# Patient Record
Sex: FEMALE | Race: WHITE | NOT HISPANIC OR LATINO | URBAN - METROPOLITAN AREA
[De-identification: names, ages, dates, MRNs, and addresses within clinical notes are randomized per-mention and may not be internally consistent; named-entity substitution may affect disease eponyms.]

---

## 2019-05-13 ENCOUNTER — TELEPHONE (OUTPATIENT)
Dept: GENETICS | Facility: HOSPITAL | Age: 54
End: 2019-05-13

## 2019-05-13 NOTE — TELEPHONE ENCOUNTER
5/13/19- I called Ms. Deirdre Doe to remind her of her cancer genetic counseling session set for 11 am at [facility] on 5/15/19. I guided her to the [address], [address] to register and asked she arrive 15 minutes early. I requested she call back to review family history and address any questions/ concerns she may have.     5/15/19- I called Ms Deirdre Doe in response to her Vm returning my call. I VM. I told her I was returning her call and was hoping to catch her before the appointment, I said we will just expect to see her at 11 am at [facility] and the family history will be reviewed during the appointment.

## 2019-05-15 ENCOUNTER — CLINICAL SUPPORT (OUTPATIENT)
Dept: GENETICS | Age: 54
End: 2019-05-15
Attending: INTERNAL MEDICINE
Payer: COMMERCIAL

## 2019-05-15 VITALS — HEIGHT: 64 IN | WEIGHT: 210 LBS | BODY MASS INDEX: 35.85 KG/M2

## 2019-05-15 DIAGNOSIS — Z84.81 FAMILY HISTORY OF GENE MUTATION: ICD-10-CM

## 2019-05-15 DIAGNOSIS — Z71.83 ENCOUNTER FOR NONPROCREATIVE GENETIC COUNSELING: Primary | ICD-10-CM

## 2019-05-15 DIAGNOSIS — Z86.0100 PERSONAL HISTORY OF COLONIC POLYPS: ICD-10-CM

## 2019-05-15 DIAGNOSIS — Z80.0 FHX: CANCER OF GI TRACT: ICD-10-CM

## 2019-05-15 DIAGNOSIS — Z83.719 FAMILY HISTORY OF COLONIC POLYPS: ICD-10-CM

## 2019-05-15 PROCEDURE — 96040 HC GENETICS COUNSELING SESSIONS: CPT | Performed by: GENETIC COUNSELOR, MS

## 2019-05-15 NOTE — PROGRESS NOTES
"Patient Name:  Deirdre Doe  : 1965       Indication for Appointment:  Deirdre Doe presented for genetic counseling and cancer risk assessment due to a family history of uterine aned colorectal cancer and polyps Deirdre Doe was self-referred and came to the session alone.    Personal History:   Deirdre Doe is 53 y.o. female of Armenian/Cuban descent with primary visit diagnosis of Encounter for nonprocreative genetic counseling [Z71.83].      Past Medical History:   Diagnosis Date   • Colon polyp     3 - type unknown; ~age 47   • Fibroid    • Lipid disorder     controlled with medication   • Ovarian cyst       Past Medical History Pertinent Negatives:   Denies History Of: Date Noted   • Disease of thyroid gland 05/15/2019     Past Surgical History:   Procedure Laterality Date   • Carpal tunnel release     •  section     • Cholecystectomy     • Colonoscopy      Q 5 years since age 40   • Ovarian cyst removal       No past surgical history pertinent negatives on file.     Height/Weight: verbally reported  Height: 1.626 m (5' 4\")  Weight: 95.3 kg (210 lb)    Gynecologic History:  Menarche age: 12 years  Age at first live birth: 37  Menopause Status: [place]-Menopause  Use of hormonal contraceptives: Yes (>5 yrs cumulative use)  Use of fertility medications: No  Use of hormone replacement therapy: No  Use of Tamoxifen/Evista: No     Social History   Substance Use Topics   • Smoking status: Passive Smoke Exposure - Never Smoker   • Smokeless tobacco: Never Used   • Alcohol use Yes      Comment: rarely       Family History:  See completed family history in pedigree below.    Genetic Education/Risk Assessment/Counseling:  Information was provided about the relationship between genes and cancer.  The concept of hereditary cancer was defined.  Natural history, risks and inheritance patterns of  cancer-associated genes were reviewed, as related to Deirdre Doe’s personal and/or family history.  Related " psychosocial aspects were discussed.    Discussion of Genetic Testing:  The pros, cons, and limitations of testing for genetic susceptibility were discussed, including but not limited to test options, possible results, potential impact on management, and psychosocial aspects.  There may be limited data on the degree of cancer risk and/or no defined management guidelines associated with some genes.  If applicable, risk assessment models and/or published tables were used to provide a mutation probability estimate. Limitations of assessment were reviewed.    Given the reported personal and/or family history, genetic testing genetic testing was offered and accepted. The following testing was ordered:    [email or web address]  [facility] Hereditary Cancer Panel      Plan:  Deirdre Doe was confirmed to have understood the aforementioned information and was assisted with decision making as needed.  Informational and supportive resources were provided. Consent was obtained to share chart note(s) with physicians. Deirdre Doe is encouraged to contact the program with personal/family history updates. Deirdre Doe will be contacted via telephone when genetic test results are available.     Additionally, pathology from Deirdre Doe's colorectal polyps will be requested.    A total of 90 minutes was spent providing genetic counseling to Deirdre Doe.

## 2019-05-15 NOTE — Clinical Note
05/15/19    Cayla Chávez MD  [address]  [address] [facility], [address] [place], [place] [id number]      Dear Dr. Chávez,    Thank you for referring your patient, Deirdre Doe, to receive care in my office. I have enclosed a summary of the care provided to Deirdre on 05/15/19.    Please contact me with any questions you may have regarding the visit.    Sincerely,             Lourdes Gutierrez, [facility]    [address] [facility] [address] [phone]    CC: Julio César Johnson MD

## 2019-05-15 NOTE — LETTER
05/15/19    David Walker, DO  [address] [place] [id number]      Dear Dr. Walker,    I am writing to confirm that your patient, Deirdre Doe, received care in my office on 05/15/19. I have enclosed a summary of the care provided to Deirdre for your reference.    Please contact me with any questions you may have regarding the visit.    Sincerely,           Lourdes Gutierrez [facility]      CC: No Recipients

## 2019-05-15 NOTE — LETTER
05/15/19    Deirdre Doe  [address] [place] [address]    Dear Ms. Doe,    Thank you for participating in the [facility].  It was a pleasure working with you. Attached is documentation from our discussion(s). It will also be sent to any physicians you indicated.      You may also choose to share this documentation with your family members. Because cancer risk is based on both personal and both maternal and paternal family history factors, as well as mutation status, your relatives are recommended to review their risks and genetic testing options (if applicable) with their own healthcare providers to derive a risk-appropriate, individualized plan.      If you have any questions, concerns, or updates to your personal/family history, please contact the [facility] at [phone] to further review your case.    Please see below for your encounter report.    Sincerely,         Lourdes Gutierrez, [facility]                                              Encounter Note       Indication for Appointment:  Deirdre Doe presented for genetic counseling and cancer risk assessment due to a family history of uterine aned colorectal cancer and polyps Deirdre Doe was self-referred and came to the session alone.    Personal History:   Deirdre Doe is 53 y.o. female of Malian/Colombian descent with primary visit diagnosis of Encounter for nonprocreative genetic counseling [Z71.83].      Past Medical History:   Diagnosis Date   • Colon polyp     3 - type unknown; ~age 47   • Fibroid    • Lipid disorder     controlled with medication   • Ovarian cyst       Past Medical History Pertinent Negatives:   Denies History Of: Date Noted   • Disease of thyroid gland 05/15/2019     Past Surgical History:   Procedure Laterality Date   • Carpal tunnel release     •  section     • Cholecystectomy     • Colonoscopy      Q 5 years since age 40   •  "Ovarian cyst removal  1995     No past surgical history pertinent negatives on file.     Height/Weight: verbally reported  Height: 1.626 m (5' 4\")  Weight: 95.3 kg (210 lb)    Gynecologic History:  Menarche age: 12 years  Age at first live birth: 37  Menopause Status: [place]-Menopause  Use of hormonal contraceptives: Yes (>5 yrs cumulative use)  Use of fertility medications: No  Use of hormone replacement therapy: No  Use of Tamoxifen/Evista: No     Social History   Substance Use Topics   • Smoking status: Passive Smoke Exposure - Never Smoker   • Smokeless tobacco: Never Used   • Alcohol use Yes      Comment: rarely       Family History:  See completed family history in pedigree below.    Genetic Education/Risk Assessment/Counseling:  Information was provided about the relationship between genes and cancer.  The concept of hereditary cancer was defined.  Natural history, risks and inheritance patterns of  cancer-associated genes were reviewed, as related to Deirdre Doe’s personal and/or family history.  Related psychosocial aspects were discussed.    Discussion of Genetic Testing:  The pros, cons, and limitations of testing for genetic susceptibility were discussed, including but not limited to test options, possible results, potential impact on management, and psychosocial aspects.  There may be limited data on the degree of cancer risk and/or no defined management guidelines associated with some genes.  If applicable, risk assessment models and/or published tables were used to provide a mutation probability estimate. Limitations of assessment were reviewed.    Given the reported personal and/or family history, genetic testing genetic testing was offered and accepted. The following testing was ordered:    [facility]  [facility] Hereditary Cancer Panel      Plan:  Deirdre Doe was confirmed to have understood the aforementioned information and was assisted with decision making as needed.  Informational and " supportive resources were provided. Consent was obtained to share chart note(s) with physicians. Deirdre Doe is encouraged to contact the program with personal/family history updates. Deirdre Doe will be contacted via telephone when genetic test results are available.     Additionally, pathology from Deirdre Doe's colorectal polyps will be requested.    A total of 90 minutes was spent providing genetic counseling to Deirdre Doe.

## 2019-05-15 NOTE — LETTER
05/15/19    Cayla Chávez MD  [address]  [address] [facility], [address]  [place] [id number]      Dear Dr. Chávez,    I am writing to confirm that your patient, Deirdre Doe, received care in my office on 05/15/19. I have enclosed a summary of the care provided to Deirdre for your reference.    Please contact me with any questions you may have regarding the visit.    Sincerely,           Lourdes Gutierrez [facility]      CC: Julio César Johnson MD

## 2019-05-15 NOTE — LETTER
05/15/19    Deirdre Doe  [address] [place] [address]    Dear Ms. Doe,    Thank you for participating in the [facility].  It was a pleasure working with you. Attached is documentation from our discussion(s). It will also be sent to any physicians you indicated.      You may also choose to share this documentation with your family members. Because cancer risk is based on both personal and both maternal and paternal family history factors, as well as mutation status, your relatives are recommended to review their risks and genetic testing options (if applicable) with their own healthcare providers to derive a risk-appropriate, individualized plan.      If you have any questions, concerns, or updates to your personal/family history, please contact the [facility] at [phone] to further review your case.    Please see below for your encounter report.    Sincerely,         Lourdes Gutierrez, [facility]                                              Encounter Note       Indication for Appointment:  Deirdre Doe presented for genetic counseling and cancer risk assessment due to a family history of uterine aned colorectal cancer and polyps Deirdre Doe was self-referred and came to the session alone.    Personal History:   Deirdre Doe is 53 y.o. female of Ukrainian/Belgian descent with primary visit diagnosis of Encounter for nonprocreative genetic counseling [Z71.83].      Past Medical History:   Diagnosis Date   • Colon polyp     3 - type unknown; ~age 47   • Fibroid    • Lipid disorder     controlled with medication   • Ovarian cyst       Past Medical History Pertinent Negatives:   Denies History Of: Date Noted   • Disease of thyroid gland 05/15/2019     Past Surgical History:   Procedure Laterality Date   • Carpal tunnel release     •  section     • Cholecystectomy     • Colonoscopy      Q 5 years since age 40   •  "Ovarian cyst removal  1995     No past surgical history pertinent negatives on file.     Height/Weight: verbally reported  Height: 1.626 m (5' 4\")  Weight: 95.3 kg (210 lb)    Gynecologic History:  Menarche age: 12 years  Age at first live birth: 37  Menopause Status: [place]-Menopause  Use of hormonal contraceptives: Yes (>5 yrs cumulative use)  Use of fertility medications: No  Use of hormone replacement therapy: No  Use of Tamoxifen/Evista: No     Social History   Substance Use Topics   • Smoking status: Passive Smoke Exposure - Never Smoker   • Smokeless tobacco: Never Used   • Alcohol use Yes      Comment: rarely       Family History:  See completed family history in pedigree below.    Genetic Education/Risk Assessment/Counseling:  Information was provided about the relationship between genes and cancer.  The concept of hereditary cancer was defined.  Natural history, risks and inheritance patterns of  cancer-associated genes were reviewed, as related to Deirdre Doe’s personal and/or family history.  Related psychosocial aspects were discussed.    Discussion of Genetic Testing:  The pros, cons, and limitations of testing for genetic susceptibility were discussed, including but not limited to test options, possible results, potential impact on management, and psychosocial aspects.  There may be limited data on the degree of cancer risk and/or no defined management guidelines associated with some genes.  If applicable, risk assessment models and/or published tables were used to provide a mutation probability estimate. Limitations of assessment were reviewed.    Given the reported personal and/or family history, genetic testing genetic testing was offered and accepted. The following testing was ordered:    [facility]  [facility] Hereditary Cancer Panel      Plan:  Deirdre Doe was confirmed to have understood the aforementioned information and was assisted with decision making as needed.  Informational and " supportive resources were provided. Consent was obtained to share chart note(s) with physicians. Deirdre Doe is encouraged to contact the program with personal/family history updates. Deirdre Doe will be contacted via telephone when genetic test results are available.     Additionally, pathology from Deirdre Doe's colorectal polyps will be requested.    A total of 90 minutes was spent providing genetic counseling to Deirdre Doe.

## 2019-06-03 ENCOUNTER — TELEPHONE (OUTPATIENT)
Dept: GENETICS | Age: 54
End: 2019-06-03

## 2019-06-03 NOTE — LETTER
08/11/19    Cayla Chávez MD  [address]  [address] [facility], [address]  [facility] [address]      Dear Dr. Chávez,    Thank you for referring your patient, Deirdre Doe, to receive care in my office. I have enclosed a summary of the care provided to Deirdre on 08/11/19.    Please contact me with any questions you may have regarding the visit.    Sincerely,             Lourdes Gutierrez, [facility]    [address] [facility] [place] [facility]  [place] [address]    CC: Julio César Johnson MD

## 2019-06-03 NOTE — LETTER
08/11/19    David Walker, DO  [address] [place] [address]      Dear Dr. Walker,    I am writing to confirm that your patient, Deirdre Doe, received care in my office on 08/11/19. I have enclosed a summary of the care provided to Deirdre for your reference.    Please contact me with any questions you may have regarding the visit.    Sincerely,           Lourdes Gutierrez [facility]      CC: No Recipients

## 2019-06-03 NOTE — LETTER
08/11/19    Deirdre Doe  [address]  [place] [address]    Dear Ms. Doe,    Thank you for participating in the [facility].  It was a pleasure working with you. Attached is documentation from our discussion(s). It will also be sent to any physicians you indicated.      You may also choose to share this documentation with your family members. Because cancer risk is based on both personal and both maternal and paternal family history factors, as well as mutation status, your relatives are recommended to review their risks and genetic testing options (if applicable) with their own healthcare providers to derive a risk-appropriate, individualized plan.      If you have any questions, concerns, or updates to your personal/family history, please contact the [facility] at [phone] to further review your case.    Please see below for your encounter report.    Sincerely,         Lourdes Gutierrez, [facility]                                              Encounter Note          Indication for Appointment:  Deirdre Doe was referred to the Cancer Risk Assessment and Genetics Program due to family history of uterine aned colorectal cancer and polyps and a familial mutation in the PMS2 gene. Genetic testing was performed.    Deirdre Doe was contacted by telephone today to discuss genetic test results, risk-based management guidelines and any potential additional test options. Follow up appointments to discuss the results in more detail with our medical director may be scheduled by contacting the Cancer Risk Assessment and Genetics Program.      Genetic Test Results:    RESULT:    Negative - No Clinically Significant Mutation Identified.  LAB/TEST:  [facility]  [facility] Hereditary Cancer Panel      Genes Analyzed: Unless otherwise noted sequencing and large rearrangement analyses were performed on the following genes:  APC,  BIANCA, AXIN2, BARD1, BMPR1A, BRCA1, BRCA2, BRIP1, CDH1, CDK4, CDKN2A, CHEK2, EPCAM (large rearrangement only), HOXB13 (sequencing only), GALNT12, MLH1, MSH2, MSH3 (excluding repetitive portions of exon 1), MSH6, MUTYH, NBN, NTHL1, PALB2, PMS2, PTEN, RAD51C, RAD51D, RNF43, RPS20, SMAD4, STK11, TP53. Sequencing was performed for select regions of POLE and POLD1, and large rearrangement analysis was performed for select regions of GREM1 (see technical specifications).        After receiving consent, the following result(s) were disclosed to Deirdre Doe:   - No reportable alterations were identified by sequencing and/or deletion/duplication analysis as interpreted by this laboratory.  This is referred to as an indeterminate negative result.  A mutation could be present that cannot be detected by the current tests performed or in a gene not tested. Additionally, biological family members may have a mutation in any of the genes tested Deirdre Doe does not given the negative result.    - Deirdre Doe was informed that the PMS2 mutation in the family was not identified. With respect to this mutation, this is referred to as a true negative result.      Personal History:   Deirdre Doe is 53 y.o. female of [place] descent.    Past Medical History:   Diagnosis Date   • Colon polyps (2 total)      - 1 hyperplastic polyp and 1 tubular adenoma (cecum) (GI Pathology Accession #[id number]); colonoscopy in 2017 was clear per Office staff.   • Fibroid    • Lipid disorder     controlled with medication   • Ovarian cyst      Past Medical History Pertinent Negatives:   Denies History Of: Date Noted   • Disease of thyroid gland 05/15/2019     Past Surgical History:   Procedure Laterality Date   • Carpal tunnel release     •  section     • Cholecystectomy     • Colonoscopy      Q 5 years since age 40   • Ovarian cyst removal       No past surgical history pertinent negatives on file.     Gynecologic  History:  Menarche age: 12 years  Age at first live birth: 37  Menopause Status: [place]-Menopause  Use of hormonal contraceptives: Yes (>5 yrs cumulative use)  Use of fertility medications: No  Use of hormone replacement therapy: No  Use of Tamoxifen/Evista: No    Social History   Substance Use Topics   • Smoking status: Passive Smoke Exposure - Never Smoker   • Smokeless tobacco: Never Used   • Alcohol use Yes      Comment: rarely       Family History:  See completed family history in pedigree below.  Of note, results of Deirdre Doe's paternal aunt were received after the initial appointment. The PMS2 mutation in Deirdre Doe's paternal aunt is called c.2113G>A ([id number]) ([facility] (Accession# [id number]).         Risk Assessment and Management:  While closer to the general population, cancer risks among those who test negative for a confirmed mutation in the family may be increased due to personal and familial risk factors.  Of note, not all affected relatives in Deirdre Doe's family have undergone testing to confirm whether the mutation identified in her paternal aunt is tracking with the other Finn-syndrome related cancers in the family.  Available screening guidelines were reviewed. The efficacy of screening for many cancers remains unclear or under investigation; as guidelines continually change, Deirdre Doe is encouraged to review the history with managing physician regularly.       Breast Management:  Deirdre Doe’s lifetime risk of developing breast cancer was calculated using the Hina model and is estimated to be that of the general population with a 5-year risk of 1.6%.  Her breast cancer risk using the Tyrer-Cuzick v7 model is approximately 14%.   Of note, this risks may be an underestimation as all breast cancer cases in the family cannot be considered in this analysis; factors such as breast density can also impact risk.  Breast cancer risk is dynamic and can  increase with age and other factors. A high lifetime risk for developing breast cancer is typically 20-25% or higher.    - National Comprehensive Cancer Network (NCCN) guidelines for breast cancer screening include: monthly self breast examinations, clinical breast examination performed by a physician every 12 months and annual mammogram beginning by age 40.    - [facility] provides information on remaining lifetime breast cancer risk using their Breast Cancer riskScore™, which is based on an analysis of 86 genetic markers combined with specific personal and family history information. Of note, the riskScore™ result has currently only been validated in patients of  and Ashkenazi Druze ancestry. Additionally, accuracy of the personal and family history information provided for this analysis can impact the riskScore™ estimate. The [facility] Breast Cancer riskScore™ model has estimated the patient’s remaining lifetime risk score to be 11%.  Of note, current NCCN management guidelines do not list the riskScore™ estimate as a validated method for assessing breast cancer risk at this time.     Gynecologic Management:  Continue gynecologic exam annually or as directed by physician.    Gastrointestinal Management:  Deirdre Doe is advised to continue with colorectal cancer screening as directed by physician, or sooner if symptoms or changes in history warrant sooner evaluation. Based on the reported family history of colorectal cancer not known to be attributable to the confirmed PMS2 mutation in the family, Deirdre Doe’s lifetime risk of developing colorectal cancer is increased 2-3 fold over that of the general population.  General population risk is approximately 5-6%; thus, Deirdre Doe’s risk is 10-18%. This may be an underestimation of risk as it does not take into account Deirdre Doe's personal history or family history of colorectal polyps. The patient was encouraged to review her  personal and family history and risk with managing gastroenterologist to determine frequency of colonoscopy.    Dermatologic Management:  Deirdre Doe is encouraged to practice skin protective behaviors, such as limiting direct sun exposure, using sunscreen, and pursuing annual skin screening by a physician is recommended.    General Management:  - Annual physical examination is encouraged.  - Adherence to a healthy lifestyle, including body mass index (BMI) <25 obtained through balanced diet and exercise, limiting intake of alcoholic beverages to less than 1 drink per day (serving equals 1 ounce of liquor, 6 ounces of wine or 8 ounces of beer) and not smoking.    Plan:  Cancer risks are based on personal history, as well as on maternal and paternal family histories, mutation status, and other factors.  Relatives are encouraged to consider risk assessment and/or genetic evaluation to derive a risk-appropriate, individualized plan.  Should family member(s) be interested in genetic evaluation, the Cancer Risk Assessment and Genetics Program can provide consultation or help to find a genetic counselor in their area.    The information provided reflects current practice guidelines and may change with new medical discoveries/technology/updated personal or family history information.  Deirdre Doe was confirmed to have understood the aforementioned information and was assisted with decision making as needed.  Informational and supportive resources were provided.  Potential psychosocial ramifications related to test results were reviewed.  Consent was obtained to share chart note(s) with physicians.  Deirdre Doe plans to discuss the above information with physicians to determine an optimal risk management plan.    Deirdre Doe should contact the program with personal/family history updates as this could alter the guidelines provided and/or available test options and/or to inquire about new information specific to this case.    As stated, there may be other genes associated with cancer risk for which Deirdre Doe was not tested.  Deirdre Doe was encouraged to contact the genetics program at [phone] with any questions or concerns and/or to periodically review test options and related insurance coverage.

## 2019-06-04 NOTE — TELEPHONE ENCOUNTER
Patient Name:  Deirdre Doe  : 1965       Indication for Appointment:  Deirdre Doe was referred to the Cancer Risk Assessment and Genetics Program due to family history of uterine aned colorectal cancer and polyps and a familial mutation in the PMS2 gene. Genetic testing was performed.    Deirdre Doe was contacted by telephone today to discuss genetic test results, risk-based management guidelines and any potential additional test options. Follow up appointments to discuss the results in more detail with our medical director may be scheduled by contacting the Cancer Risk Assessment and Genetics Program.      Genetic Test Results:    RESULT:    Negative - No Clinically Significant Mutation Identified.  LAB/TEST:  [facility]  [facility] Hereditary Cancer Panel      Genes Analyzed: Unless otherwise noted sequencing and large rearrangement analyses were performed on the following genes:  APC, BIANCA, AXIN2, BARD1, BMPR1A, BRCA1, BRCA2, BRIP1, CDH1, CDK4, CDKN2A, CHEK2, EPCAM (large rearrangement only), HOXB13 (sequencing only), GALNT12, MLH1, MSH2, MSH3 (excluding repetitive portions of exon 1), MSH6, MUTYH, NBN, NTHL1, PALB2, PMS2, PTEN, RAD51C, RAD51D, RNF43, RPS20, SMAD4, STK11, TP53. Sequencing was performed for select regions of POLE and POLD1, and large rearrangement analysis was performed for select regions of GREM1 (see technical specifications).        After receiving consent, the following result(s) were disclosed to Deirdre Doe:   - No reportable alterations were identified by sequencing and/or deletion/duplication analysis as interpreted by this laboratory.  This is referred to as an indeterminate negative result.  A mutation could be present that cannot be detected by the current tests performed or in a gene not tested. Additionally, biological family members may have a mutation in any of the genes tested Deirdre Doe does not given the negative result.    - Deirdre Doe was informed that  the PMS2 mutation in the family was not identified. With respect to this mutation, this is referred to as a true negative result.      Personal History:   Deirdre Doe is 53 y.o. female of [place]alt[place] descent.    Past Medical History:   Diagnosis Date   • Colon polyps (2 total)      - 1 hyperplastic polyp and 1 tubular adenoma (cecum) (GI Pathology Accession #[id number]); colonoscopy in 2017 was clear per Office staff.   • Fibroid    • Lipid disorder     controlled with medication   • Ovarian cyst      Past Medical History Pertinent Negatives:   Denies History Of: Date Noted   • Disease of thyroid gland 05/15/2019     Past Surgical History:   Procedure Laterality Date   • Carpal tunnel release     •  section     • Cholecystectomy     • Colonoscopy      Q 5 years since age 40   • Ovarian cyst removal       No past surgical history pertinent negatives on file.     Gynecologic History:  Menarche age: 12 years  Age at first live birth: 37  Menopause Status: [place]-Menopause  Use of hormonal contraceptives: Yes (>5 yrs cumulative use)  Use of fertility medications: No  Use of hormone replacement therapy: No  Use of Tamoxifen/Evista: No    Social History   Substance Use Topics   • Smoking status: Passive Smoke Exposure - Never Smoker   • Smokeless tobacco: Never Used   • Alcohol use Yes      Comment: rarely       Family History:  See completed family history in pedigree below.  Of note, results of Deirdre Doe's paternal aunt were received after the initial appointment. The PMS2 mutation in Deirdre Doe's paternal aunt is called c.2113G>A (p.[id number]) ([facility] (Accession# [id number]).         Risk Assessment and Management:  While closer to the general population, cancer risks among those who test negative for a confirmed mutation in the family may be increased due to personal and familial risk factors.  Of note, not all affected relatives in Deirdre Hoang  family have undergone testing to confirm whether the mutation identified in her paternal aunt is tracking with the other Finn-syndrome related cancers in the family.  Available screening guidelines were reviewed. The efficacy of screening for many cancers remains unclear or under investigation; as guidelines continually change, Deirdre Doe is encouraged to review the history with managing physician regularly.       Breast Management:  Deirdre Doe’s lifetime risk of developing breast cancer was calculated using the Hina model and is estimated to be that of the general population with a 5-year risk of 1.6%.  Her breast cancer risk using the Tyrer-Cuzick v7 model is approximately 14%.   Of note, this risks may be an underestimation as all breast cancer cases in the family cannot be considered in this analysis; factors such as breast density can also impact risk.  Breast cancer risk is dynamic and can increase with age and other factors. A high lifetime risk for developing breast cancer is typically 20-25% or higher.    - National Comprehensive Cancer Network (NCCN) guidelines for breast cancer screening include: monthly self breast examinations, clinical breast examination performed by a physician every 12 months and annual mammogram beginning by age 40.    - [facility] provides information on remaining lifetime breast cancer risk using their Breast Cancer riskScore™, which is based on an analysis of 86 genetic markers combined with specific personal and family history information. Of note, the riskScore™ result has currently only been validated in patients of  and Ashkenazi Mos[place] ancestry. Additionally, accuracy of the personal and family history information provided for this analysis can impact the riskScore™ estimate. The [facility] Breast Cancer riskScore™ model has estimated the patient’s remaining lifetime risk score to be 11%.  Of note, current NCCN management guidelines do not  list the riskScore™ estimate as a validated method for assessing breast cancer risk at this time.     Gynecologic Management:  Continue gynecologic exam annually or as directed by physician.    Gastrointestinal Management:  Deirdre Doe is advised to continue with colorectal cancer screening as directed by physician, or sooner if symptoms or changes in history warrant sooner evaluation. Based on the reported family history of colorectal cancer not known to be attributable to the confirmed PMS2 mutation in the family, Deirdre Doe’s lifetime risk of developing colorectal cancer is increased 2-3 fold over that of the general population.  General population risk is approximately 5-6%; thus, Deirdre Doe’s risk is 10-18%. This may be an underestimation of risk as it does not take into account Deirdre Doe's personal history or family history of colorectal polyps. The patient was encouraged to review her personal and family history and risk with managing gastroenterologist to determine frequency of colonoscopy.    Dermatologic Management:  Deirdre Doe is encouraged to practice skin protective behaviors, such as limiting direct sun exposure, using sunscreen, and pursuing annual skin screening by a physician is recommended.    General Management:  - Annual physical examination is encouraged.  - Adherence to a healthy lifestyle, including body mass index (BMI) <25 obtained through balanced diet and exercise, limiting intake of alcoholic beverages to less than 1 drink per day (serving equals 1 ounce of liquor, 6 ounces of wine or 8 ounces of beer) and not smoking.    Plan:  Cancer risks are based on personal history, as well as on maternal and paternal family histories, mutation status, and other factors.  Relatives are encouraged to consider risk assessment and/or genetic evaluation to derive a risk-appropriate, individualized plan.  Should family member(s) be interested in genetic evaluation, the Cancer Risk Assessment and  Genetics Program can provide consultation or help to find a genetic counselor in their area.    The information provided reflects current practice guidelines and may change with new medical discoveries/technology/updated personal or family history information.  Deirdre Doe was confirmed to have understood the aforementioned information and was assisted with decision making as needed.  Informational and supportive resources were provided.  Potential psychosocial ramifications related to test results were reviewed.  Consent was obtained to share chart note(s) with physicians.  Deirdre Doe plans to discuss the above information with physicians to determine an optimal risk management plan.    Deirdre Doe should contact the program with personal/family history updates as this could alter the guidelines provided and/or available test options and/or to inquire about new information specific to this case.   As stated, there may be other genes associated with cancer risk for which Deirdre Doe was not tested.  Deirdre Doe was encouraged to contact the genetics program at [phone] with any questions or concerns and/or to periodically review test options and related insurance coverage.

## 2019-06-05 LAB
LYMPH SUBSET INTERP BLD FC-IMP: NORMAL
SCAN RESULT: NORMAL

## 2021-04-04 ENCOUNTER — HOSPITAL ENCOUNTER (EMERGENCY)
Age: 56
Discharge: HOME | End: 2021-04-04
Attending: EMERGENCY MEDICINE
Payer: COMMERCIAL

## 2021-04-04 ENCOUNTER — APPOINTMENT (OUTPATIENT)
Dept: CT IMAGING | Age: 56
End: 2021-04-04
Attending: EMERGENCY MEDICINE
Payer: COMMERCIAL

## 2021-04-04 VITALS
HEIGHT: 64 IN | HEART RATE: 70 BPM | SYSTOLIC BLOOD PRESSURE: 139 MMHG | WEIGHT: 170 LBS | DIASTOLIC BLOOD PRESSURE: 111 MMHG | BODY MASS INDEX: 29.02 KG/M2 | TEMPERATURE: 98.1 F | RESPIRATION RATE: 18 BRPM | OXYGEN SATURATION: 100 %
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
## 2021-04-04 DIAGNOSIS — S39.012A STRAIN OF LUMBAR REGION, INITIAL ENCOUNTER: ICD-10-CM

## 2021-04-04 DIAGNOSIS — S09.90XA INJURY OF HEAD, INITIAL ENCOUNTER: ICD-10-CM

## 2021-04-04 DIAGNOSIS — S16.1XXA STRAIN OF NECK MUSCLE, INITIAL ENCOUNTER: ICD-10-CM

## 2021-04-04 PROCEDURE — 72125 CT NECK SPINE W/O DYE: CPT

## 2021-04-04 PROCEDURE — 74011250637 HC RX REV CODE- 250/637: Performed by: EMERGENCY MEDICINE

## 2021-04-04 PROCEDURE — 72131 CT LUMBAR SPINE W/O DYE: CPT

## 2021-04-04 PROCEDURE — 70450 CT HEAD/BRAIN W/O DYE: CPT

## 2021-04-04 PROCEDURE — 99284 EMERGENCY DEPT VISIT MOD MDM: CPT

## 2021-04-04 RX ORDER — IBUPROFEN 800 MG/1
800 TABLET ORAL
Qty: 20 TAB | Refills: 0 | Status: SHIPPED | OUTPATIENT
Start: 2021-04-04 | End: 2021-04-11

## 2021-04-04 RX ORDER — HYDROCHLOROTHIAZIDE 25 MG/1
25 TABLET ORAL DAILY
COMMUNITY

## 2021-04-04 RX ORDER — CYCLOBENZAPRINE HCL 10 MG
5 TABLET ORAL 2 TIMES DAILY
Qty: 10 TAB | Refills: 0 | Status: SHIPPED | OUTPATIENT
Start: 2021-04-04

## 2021-04-04 RX ORDER — IBUPROFEN 800 MG/1
800 TABLET ORAL ONCE
Status: COMPLETED | OUTPATIENT
Start: 2021-04-04 | End: 2021-04-04

## 2021-04-04 RX ORDER — LOSARTAN POTASSIUM 25 MG/1
25 TABLET ORAL DAILY
COMMUNITY

## 2021-04-04 RX ORDER — ACETAMINOPHEN 500 MG
1000 TABLET ORAL ONCE
Status: COMPLETED | OUTPATIENT
Start: 2021-04-04 | End: 2021-04-04

## 2021-04-04 RX ORDER — CYCLOBENZAPRINE HCL 10 MG
10 TABLET ORAL ONCE
Status: COMPLETED | OUTPATIENT
Start: 2021-04-04 | End: 2021-04-04

## 2021-04-04 RX ADMIN — IBUPROFEN 800 MG: 800 TABLET, FILM COATED ORAL at 00:33

## 2021-04-04 RX ADMIN — ACETAMINOPHEN 1000 MG: 500 TABLET, FILM COATED ORAL at 00:33

## 2021-04-04 RX ADMIN — CYCLOBENZAPRINE 10 MG: 10 TABLET, FILM COATED ORAL at 01:50
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
## 2022-03-08 ENCOUNTER — OFFICE VISIT (OUTPATIENT)
Dept: GYNECOLOGY | Facility: CLINIC | Age: 57
End: 2022-03-08
Payer: COMMERCIAL

## 2022-03-08 VITALS — SYSTOLIC BLOOD PRESSURE: 130 MMHG | DIASTOLIC BLOOD PRESSURE: 88 MMHG | WEIGHT: 212.4 LBS

## 2022-03-08 DIAGNOSIS — N95.0 PMB (POSTMENOPAUSAL BLEEDING): Primary | ICD-10-CM

## 2022-03-08 PROCEDURE — 99203 OFFICE O/P NEW LOW 30 MIN: CPT | Performed by: OBSTETRICS & GYNECOLOGY

## 2022-03-08 RX ORDER — ATORVASTATIN CALCIUM 10 MG/1
TABLET, FILM COATED ORAL EVERY 24 HOURS
COMMUNITY

## 2022-03-08 NOTE — PROGRESS NOTES
Assessment/Plan:         Diagnoses and all orders for this visit:    PMB (postmenopausal bleeding): RTO TVS/SIS   If normal an bleeding really occurs discussed options including short course of hormonal treatment versus endometrial ablation versus hysterectomy    Other orders  -     atorvastatin (LIPITOR) 10 mg tablet; every 24 hours        Subjective:      Patient ID: Chivo Sadler is a 55 y o  female  HPI  G 1 P 0101, c section x 1, presents to the office complaining of postmenopausal bleeding  She had a prolonged episode of bleeding described as heavy with clots in early February  Previous gynecologist in endometrial biopsy on February 11th which was benign  Prior to that she had an ultrasound which showed endometrial thickness of 7 mm  She was placed on progesterone for 14 days  She only took it for 2 days and then discontinued it secondary to some side effects  She had a withdrawal bleed immediately after stopping the progesterone  Her gynecologist had her resume the progesterone  The bleeding has significantly subsided to the present time she is just having scant bleeding  The following portions of the patient's history were reviewed and updated as appropriate:   She  has no past medical history on file  She There are no problems to display for this patient  She  has no past surgical history on file  Her family history includes Colon cancer in her mother; Diabetes in her mother; Heart attack in her brother; Heart disease in her father; Heart failure in her sister; Hypertension in her father; Lung cancer in her father  She  reports that she has never smoked  She has never used smokeless tobacco  She reports current alcohol use  She reports that she does not use drugs  Current Outpatient Medications   Medication Sig Dispense Refill    atorvastatin (LIPITOR) 10 mg tablet every 24 hours       No current facility-administered medications for this visit       Current Outpatient Medications on File Prior to Visit   Medication Sig    atorvastatin (LIPITOR) 10 mg tablet every 24 hours     No current facility-administered medications on file prior to visit  She has No Known Allergies       Review of Systems   Constitutional: Negative  Gastrointestinal: Negative  Genitourinary: Positive for menstrual problem  Objective:      /88   Wt 96 3 kg (212 lb 6 4 oz)          Physical Exam  Vitals reviewed  Cardiovascular:      Rate and Rhythm: Normal rate and regular rhythm  Pulses: Normal pulses  Heart sounds: Normal heart sounds  Pulmonary:      Effort: Pulmonary effort is normal       Breath sounds: Normal breath sounds  Abdominal:      General: There is no distension  Palpations: Abdomen is soft  There is no mass  Tenderness: There is no abdominal tenderness  There is no guarding or rebound  Hernia: No hernia is present  There is no hernia in the left inguinal area or right inguinal area  Genitourinary:     General: Normal vulva  Labia:         Right: No rash, tenderness or lesion  Left: No rash, tenderness or lesion  Vagina: Normal       Cervix: Normal       Uterus: Normal        Adnexa:         Right: No mass, tenderness or fullness  Left: No mass, tenderness or fullness  Comments: Scant bleeding  Lymphadenopathy:      Lower Body: No right inguinal adenopathy  No left inguinal adenopathy  Neurological:      Mental Status: She is alert

## 2022-03-09 ENCOUNTER — OFFICE VISIT (OUTPATIENT)
Dept: GYNECOLOGY | Facility: CLINIC | Age: 57
End: 2022-03-09
Payer: COMMERCIAL

## 2022-03-09 ENCOUNTER — ULTRASOUND (OUTPATIENT)
Dept: GYNECOLOGY | Facility: CLINIC | Age: 57
End: 2022-03-09
Payer: COMMERCIAL

## 2022-03-09 DIAGNOSIS — N95.0 PMB (POSTMENOPAUSAL BLEEDING): Primary | ICD-10-CM

## 2022-03-09 PROCEDURE — 76831 ECHO EXAM UTERUS: CPT | Performed by: OBSTETRICS & GYNECOLOGY

## 2022-03-09 PROCEDURE — 99213 OFFICE O/P EST LOW 20 MIN: CPT | Performed by: OBSTETRICS & GYNECOLOGY

## 2022-03-09 PROCEDURE — 76830 TRANSVAGINAL US NON-OB: CPT | Performed by: OBSTETRICS & GYNECOLOGY

## 2022-03-09 PROCEDURE — 58340 CATHETER FOR HYSTEROGRAPHY: CPT | Performed by: OBSTETRICS & GYNECOLOGY

## 2022-03-09 PROCEDURE — 58100 BIOPSY OF UTERUS LINING: CPT | Performed by: OBSTETRICS & GYNECOLOGY

## 2022-03-09 RX ORDER — NORETHINDRONE ACETATE AND ETHINYL ESTRADIOL, ETHINYL ESTRADIOL AND FERROUS FUMARATE 1MG-10(24)
1 KIT ORAL DAILY
Qty: 84 TABLET | Refills: 0 | Status: SHIPPED | OUTPATIENT
Start: 2022-03-09 | End: 2022-06-01

## 2022-03-09 NOTE — PROGRESS NOTES
AMB US Pelvic Non OB    Date/Time: 3/9/2022 7:02 AM  Performed by: Army Mitchell  Authorized by: Carola Shaffer DO   Universal Protocol:  Patient identity confirmed: verbally with patient      Procedure details:     Technique:  Transvaginal US, Non-OB    Position: lithotomy exam    Uterine findings:     Length (cm): 11 56    Height (cm):  7 43    Width (cm):  10 2    Endometrial stripe: identified      Endometrium thickness (mm):  11 1  Left ovary findings:     Left ovary:  Visualized    Length (cm): 3 42    Height (cm): 2 22    Width (cm): 2 36  Right ovary findings:     Right ovary:  Unable to visualize  Other findings:     Free pelvic fluid: not identified      Free peritoneal fluid: not identified    Post-Procedure Details:     Impression:  Enlarged anteverted uterus is inhomogeneous throughout without distinct fibroids  Leiomyomatous changes are present however fibroids cannot clearly be measured in two planes  The right ovary is not visualized secondary to overlying bowel gas  The left ovary appears within normal limits  No free fluid  Tolerance: Tolerated well, no immediate complications    Complications: no complications    Additional Procedure Comments:      [employer] P5 transvaginal transducer E8C with Serial Number [id number] was used during procedure and subsequently cleaned with high level disinfection utilizing the [employer]       Ultrasound performed at:     [facility] [address]  [address] [facility]  [address], [address] [facility]  Phone: [phone]  Fax:  [phone]    Date/Time: 3/9/2022 7:03 AM  Performed by: Carola Shaffer DO  Authorized by: Carola Shaffer DO   Universal Protocol:  Patient identity confirmed: verbally with patient      Pre-procedure:     Prepped with: Betadine    Procedure:     Cervix cleaned and prepped: yes      Catheter inserted: yes      Uterine cavity distended with saline: yes Post-procedure:     Patient observed: yes      Post procedure instructions given to patient: yes      Patient tolerated procedure well with no complications: yes    Comments:      Sonohysterogram demonstrates a smooth appearing endometrium     Endometrial biopsy    Date/Time: 3/9/2022 7:04 AM  Performed by: Chucho Olivares DO  Authorized by: Chucho Olivares DO   Universal Protocol:  Patient identity confirmed: verbally with patient      Indication:     Indications: Post-menopausal bleeding    Procedure:     Procedure: endometrial biopsy with Pipelle      A bivalve speculum was placed in the vagina: yes      Cervix cleaned and prepped: yes      Specimen collected: specimen collected and sent to pathology      Patient tolerated procedure well with no complications: yes

## 2022-03-09 NOTE — PROGRESS NOTES
Assessment/Plan:         Diagnoses and all orders for this visit:    PMB (postmenopausal bleeding)/ fibroids:  Reviewed ultrasound findings with patient  Discussed options including medical management versus surgical management  Discussed hysterectomy  Discussed [facility] BS versus [facility] BS  Patient would like to try short course of hormonal treatment  If this does not work who with an most likely proceed to an [facility] BS        Subjective:      Patient ID: Yamilka Toney is a 55 y o  female  HPI  note from yesterday:   1 P 0101, c section x 1, presents to the office complaining of postmenopausal bleeding  She had a prolonged episode of bleeding described as heavy with clots in early February  Previous gynecologist in endometrial biopsy on February 11th which was benign  Prior to that she had an ultrasound which showed endometrial thickness of 7 mm  She was placed on progesterone for 14 days  She only took it for 2 days and then discontinued it secondary to some side effects  She had a withdrawal bleed immediately after stopping the progesterone  Her gynecologist had her resume the progesterone  The bleeding has significantly subsided to the present time she is just having scant bleeding  Advised to return to the office for transvaginal scan possible biopsy possible saline infusion hysterosonography      AMB US Pelvic Non OB     Date/Time: 3/9/2022 7:02 AM  Performed by: Peter Montanez  Authorized by: Tosha Bland DO   Universal Protocol:  Patient identity confirmed: verbally with patient        Procedure details:     Technique:  Transvaginal US, Non-OB    Position: lithotomy exam    Uterine findings:     Length (cm): 11 56    Height (cm):  7 43    Width (cm):  10 2    Endometrial stripe: identified      Endometrium thickness (mm):  11 1  Left ovary findings:     Left ovary:  Visualized    Length (cm): 3 42    Height (cm): 2 22    Width (cm): 2 36  Right ovary findings:     Right ovary:  Unable to visualize  Other findings:     Free pelvic fluid: not identified      Free peritoneal fluid: not identified    Post-Procedure Details:     Impression:  Enlarged anteverted uterus is inhomogeneous throughout without distinct fibroids  Leiomyomatous changes are present however fibroids cannot clearly be measured in two planes  The right ovary is not visualized secondary to overlying bowel gas  The left ovary appears within normal limits  No free fluid  Tolerance: Tolerated well, no immediate complications    Complications: no complications    Additional Procedure Comments:      GE Logiq P5 transvaginal transducer E8C with Serial Number [id number] was used during procedure and subsequently cleaned with high level disinfection utilizing the [employer]       Ultrasound performed at:   [address]  [address] [facility]  [address], [address]  Phone: [phone]  Fax:  [phone]     Sonohysterogram     Date/Time: 3/9/2022 7:03 AM  Performed by: Hannah Trevino DO  Authorized by: Hannah Trevino DO   Universal Protocol:  Patient identity confirmed: verbally with patient        Pre-procedure:     Prepped with: Betadine    Procedure:     Cervix cleaned and prepped: yes      Catheter inserted: yes      Uterine cavity distended with saline: yes    Post-procedure:     Patient observed: yes      Post procedure instructions given to patient: yes      Patient tolerated procedure well with no complications: yes    Comments:      Sonohysterogram demonstrates a smooth appearing endometrium     Endometrial biopsy     Date/Time: 3/9/2022 7:04 AM  Performed by: Hannah Trevino DO  Authorized by: Hannah Trevino DO   Universal Protocol:  Patient identity confirmed: verbally with patient        Indication:     Indications: Post-menopausal bleeding    Procedure:     Procedure: endometrial biopsy with Pipelle      A bivalve speculum was placed in the vagina: yes      Cervix cleaned and prepped: yes      Specimen collected: specimen collected and sent to pathology      Patient tolerated procedure well with no complications: yes             The following portions of the patient's history were reviewed and updated as appropriate:   She  has no past medical history on file  She There are no problems to display for this patient  She  has no past surgical history on file  Her family history includes Colon cancer in her mother; Diabetes in her mother; Heart attack in her brother; Heart disease in her father; Heart failure in her sister; Hypertension in her father; Lung cancer in her father  She  reports that she has never smoked  She has never used smokeless tobacco  She reports current alcohol use  She reports that she does not use drugs  Current Outpatient Medications   Medication Sig Dispense Refill    atorvastatin (LIPITOR) 10 mg tablet every 24 hours       No current facility-administered medications for this visit  Current Outpatient Medications on File Prior to Visit   Medication Sig    atorvastatin (LIPITOR) 10 mg tablet every 24 hours     No current facility-administered medications on file prior to visit  She has No Known Allergies       Review of Systems      Objective: There were no vitals taken for this visit           Physical Exam

## 2022-03-16 LAB
PATH REPORT.SITE OF ORIGIN SPEC: NORMAL
PAYMENT PROCEDURE: NORMAL
SL AMB .: NORMAL

## 2023-05-15 RX ORDER — CYCLOBENZAPRINE HCL 10 MG
5 TABLET ORAL 2 TIMES DAILY
COMMUNITY
Start: 2021-04-04

## 2023-05-15 RX ORDER — HYDROCHLOROTHIAZIDE 25 MG/1
25 TABLET ORAL DAILY
COMMUNITY

## 2023-05-15 RX ORDER — LOSARTAN POTASSIUM 25 MG/1
25 TABLET ORAL DAILY
COMMUNITY